# Patient Record
(demographics unavailable — no encounter records)

---

## 2024-10-08 NOTE — HISTORY OF PRESENT ILLNESS
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  The pain is located  anterior and posterior, medial The pain is associated with catchihng, bucklig and sense of instability Worse with leg crossing activity, kneeling on the knee and better at rest. Has tried icing, Advil, activiyt mod   10/8/24 - had csi (9/25) cont symptoms, had mri

## 2024-10-08 NOTE — IMAGING
[de-identified] :  RIGHT KNEE Inspection:  minimal effusion Palpation: medial facet of the patella tenderness  Knee Range of Motion:  0-135 Strength: 5/5 Quadriceps strength, 5/5 Hamstring strength, 4/5 Hip Abductor strength Neurological: light touch is intact throughout Ligament Stability and Special Tests:  McMurrays: neg Lachman: neg Pivot Shift: neg Posterior Drawer: neg Valgus: neg Varus: neg Patella Apprehension: neg Patella Maltracking: neg

## 2024-10-08 NOTE — ASSESSMENT
[FreeTextEntry1] :  mri right knee 9/27/24 - acl mucoid change, mcl sprian, plica, eff   - We discussed their diagnosis and treatment options at length including the risks and benefits of both surgical and non-surgical options. Surgical risks include but are not limited to pain, infection, bleeding, vascular injury, numbness, tingling, nerve damage. - Due to risks of surgery, they will continue conservative treatment with PT, icing, and anti-inflammatory medications. - Hinged knee brace in order to minimize instability. - The patient was advised to wear the brace at all times for the first 3 weeks. Subsequently, they will wear the brace while weight bearing for the next 3 weeks. Finally, they will wear the brace only while playing sports for the last 3 weeks. - Follow up in 3 weeks to re-evaluate.    Medication Discussion: 1) We discussed a comprehensive treatment plan that included possible pharmaceutical management involving the use of prescription strength medications including but not limited to options such as oral Naprosyn 500mg BID, once daily Meloxicam 15 mg, or 500-650 mg Tylenol versus over the counter oral medications in addition to discussing possible topical prescription Pennsaid vs  Voltaren gel. 2) There is a moderate risk of morbidity with further treatment, especially from use of prescription strength medications and possible side effects of these medications which include but are not limited to upset stomach with oral medications, skin reactions to topical medications and GI/cardiac/renal issues with long term use. 3) I recommended that the patient follow-up with their medical physician if there are any significant potential issues with long term medication use such as interactions with current medications or with exacerbation of underlying medical comorbidities. 4) The benefits and risks associated with use of oral and / or topical prescription and over the counter anti-inflammatory medications were discussed with the patient. The patient voiced understanding of the risks including but not limited to bleeding, stroke, kidney dysfunction, heart disease, and were referred to the black box warning label for further information.

## 2024-10-10 NOTE — HISTORY OF PRESENT ILLNESS
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  The pain is located  anterior and posterior, medial The pain is associated with catchihng, bucklig and sense of instability Worse with leg crossing activity, kneeling on the knee and better at rest. Has tried icing, Advil, activiyt mod   10/10/24 - had csi (9/25) cont symptoms, had mri

## 2024-10-10 NOTE — HISTORY OF PRESENT ILLNESS
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  The pain is located  anterior and posterior, medial The pain is associated with catchihng, bucklig and sense of instability Worse with leg crossing activity, kneeling on the knee and better at rest. Has tried icing, Advil, activiyt mod   10/10/24 - had csi (9/25) cont symptoms, had mri

## 2024-10-10 NOTE — IMAGING
[de-identified] :  RIGHT KNEE Inspection:  minimal effusion Palpation: medial facet of the patella tenderness  Knee Range of Motion:  0-135 Strength: 5/5 Quadriceps strength, 5/5 Hamstring strength, 4/5 Hip Abductor strength Neurological: light touch is intact throughout Ligament Stability and Special Tests:  McMurrays: neg Lachman: neg Pivot Shift: neg Posterior Drawer: neg Valgus: neg Varus: neg Patella Apprehension: neg Patella Maltracking: neg

## 2024-10-10 NOTE — IMAGING
[de-identified] :  RIGHT KNEE Inspection:  minimal effusion Palpation: medial facet of the patella tenderness  Knee Range of Motion:  0-135 Strength: 5/5 Quadriceps strength, 5/5 Hamstring strength, 4/5 Hip Abductor strength Neurological: light touch is intact throughout Ligament Stability and Special Tests:  McMurrays: neg Lachman: neg Pivot Shift: neg Posterior Drawer: neg Valgus: neg Varus: neg Patella Apprehension: neg Patella Maltracking: neg

## 2024-10-31 NOTE — HISTORY OF PRESENT ILLNESS
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  The pain is located  anterior and posterior, medial The pain is associated with catchihng, bucklig and sense of instability Worse with leg crossing activity, kneeling on the knee and better at rest. Has tried icing, Advil, activiyt mod   10/10/24 - had csi (9/25) cont symptoms, had mri 10/31/24 - using brace, sent for PT

## 2024-10-31 NOTE — IMAGING
[de-identified] :  RIGHT KNEE Inspection:  minimal effusion Palpation: medial facet of the patella tenderness  Knee Range of Motion:  0-135 Strength: 5/5 Quadriceps strength, 5/5 Hamstring strength, 4/5 Hip Abductor strength Neurological: light touch is intact throughout Ligament Stability and Special Tests:  McMurrays: neg Lachman: neg Pivot Shift: neg Posterior Drawer: neg Valgus: neg Varus: neg Patella Apprehension: neg Patella Maltracking: neg

## 2025-07-21 NOTE — ASSESSMENT
Patient: Sandee Eddy    Procedure Summary       Date: 07/21/25 Room / Location: [facility] ENDOSCOPY 02 / [facility]    Anesthesia Start: 1029 Anesthesia Stop:     Procedure: COLONOSCOPY Diagnosis:       Colon cancer screening      (colon polyp, hemorrhoids)    Surgeons: Korey Lovell MD Anesthesiologist: Cecilia Rocha CRNA    Anesthesia Type: MAC, general ASA Status: 3            Anesthesia Type: MAC, general    Vitals Value Taken Time   /79 07/21/25 10:55   Temp  07/21/25 10:57   Pulse 84 07/21/25 10:56   Resp 24 07/21/25 10:56   SpO2 99 % 07/21/25 10:56   Vitals shown include unfiled device data.    [facility] AN Post Evaluation:   Patient Evaluated in Patient location: [place] [id number].  Patient Participation: complete - patient participated  Level of Consciousness: awake  Pain Score: 0  Pain Management: adequate  Airway Patency:patent  Dental exam unchanged from preop  Yes    Nausea/Vomiting: none  Cardiovascular Status: stable  Respiratory Status: room air  Postoperative Hydration stable      Cecilia Rocha CRNA  7/21/2025 10:57 AM   [FreeTextEntry1] : mri right knee 9/27/24 - acl mucoid change, mcl sprian, plica, eff   - We discussed their diagnosis and treatment options at length including the risks and benefits of both surgical and non-surgical options. Surgical risks include but are not limited to pain, infection, bleeding, vascular injury, numbness, tingling, nerve damage. - Due to risks of surgery, they will continue conservative treatment with PT, icing, and anti-inflammatory medications. - The patient was provided with a prescription for Physical Therapy. - The patient is to continue home exercises learned at physical therapy. - The patient was advised to let pain guide the gradual advancement of activities.